# Patient Record
Sex: MALE | Race: WHITE | NOT HISPANIC OR LATINO
[De-identification: names, ages, dates, MRNs, and addresses within clinical notes are randomized per-mention and may not be internally consistent; named-entity substitution may affect disease eponyms.]

---

## 2017-03-03 ENCOUNTER — HOSPITAL (OUTPATIENT)
Dept: OTHER | Age: 77
End: 2017-03-03
Attending: EMERGENCY MEDICINE

## 2023-04-12 DIAGNOSIS — M62.81 MUSCLE WEAKNESS (GENERALIZED): Primary | ICD-10-CM

## 2023-05-03 ENCOUNTER — NURSE TRIAGE (OUTPATIENT)
Dept: TELEHEALTH | Age: 83
End: 2023-05-03

## 2024-07-26 ENCOUNTER — APPOINTMENT (OUTPATIENT)
Dept: URBAN - METROPOLITAN AREA CLINIC 240 | Age: 84
Setting detail: DERMATOLOGY
End: 2024-07-26

## 2024-07-26 DIAGNOSIS — Z41.9 ENCOUNTER FOR PROCEDURE FOR PURPOSES OTHER THAN REMEDYING HEALTH STATE, UNSPECIFIED: ICD-10-CM

## 2024-07-26 PROCEDURE — OTHER COSMETIC EXTRACTIONS: OTHER

## 2024-07-26 ASSESSMENT — LOCATION SIMPLE DESCRIPTION DERM
LOCATION SIMPLE: RIGHT CHEEK
LOCATION SIMPLE: LEFT CHEEK

## 2024-07-26 ASSESSMENT — LOCATION ZONE DERM: LOCATION ZONE: FACE

## 2024-07-26 ASSESSMENT — LOCATION DETAILED DESCRIPTION DERM
LOCATION DETAILED: LEFT LATERAL MALAR CHEEK
LOCATION DETAILED: RIGHT INFERIOR PREAURICULAR CHEEK

## 2024-07-26 NOTE — PROCEDURE: COSMETIC EXTRACTIONS
Detail Level: Detailed
Render The Number Of Extractions: Yes
Anesthesia Volume In Cc: 0
Consent: The patient's consent was obtained including but not limited to risks of crusting, scabbing, blistering, scarring, darker or lighter pigmentary change, recurrence, incomplete removal and infection.
Post-Care Instructions: I reviewed with the patient in detail post-care instructions. Patient is to wear sunprotection, and avoid picking at any of the treated lesions. Pt may apply Vaseline to crusted or scabbing areas.
Statement Selected

## 2024-12-10 ENCOUNTER — APPOINTMENT (OUTPATIENT)
Dept: URBAN - METROPOLITAN AREA CLINIC 240 | Age: 84
Setting detail: DERMATOLOGY
End: 2024-12-11

## 2024-12-10 DIAGNOSIS — L82.0 INFLAMED SEBORRHEIC KERATOSIS: ICD-10-CM

## 2024-12-10 DIAGNOSIS — L30.9 DERMATITIS, UNSPECIFIED: ICD-10-CM

## 2024-12-10 PROCEDURE — OTHER COUNSELING: OTHER

## 2024-12-10 PROCEDURE — OTHER INTRALESIONAL KENALOG: OTHER

## 2024-12-10 PROCEDURE — OTHER ADDITIONAL NOTES: OTHER

## 2024-12-10 PROCEDURE — 11900 INJECT SKIN LESIONS </W 7: CPT

## 2024-12-10 PROCEDURE — 99212 OFFICE O/P EST SF 10 MIN: CPT | Mod: 25

## 2024-12-10 PROCEDURE — OTHER PRESCRIPTION: OTHER

## 2024-12-10 RX ORDER — HYDROCORTISONE 25 MG/G
CREAM TOPICAL
Qty: 30 | Refills: 0 | Status: ERX | COMMUNITY
Start: 2024-12-10

## 2024-12-10 ASSESSMENT — LOCATION DETAILED DESCRIPTION DERM: LOCATION DETAILED: LEFT LATERAL MALAR CHEEK

## 2024-12-10 ASSESSMENT — LOCATION SIMPLE DESCRIPTION DERM: LOCATION SIMPLE: LEFT CHEEK

## 2024-12-10 ASSESSMENT — LOCATION ZONE DERM: LOCATION ZONE: FACE

## 2024-12-10 NOTE — PROCEDURE: INTRALESIONAL KENALOG
Validate Note Data When Using Inventory: Yes
X Size Of Lesion In Cm (Optional): 0
Concentration Of Kenalog Solution Injected (Mg/Ml): 10.0
Include Z78.9 (Other Specified Conditions Influencing Health Status) As An Associated Diagnosis?: No
Expiration Date For Kenlast (Optional): 10/2025
Show Inventory Tab: Hide
Kenalog Preparation: Kenalog
Medical Necessity Clause: This procedure was medically necessary because the lesions that were treated were:
Ndc# For Kenalog Only: 8971-2215-08
Total Volume (Ccs): 0.01
Treatment Number (Optional): 1
Detail Level: Detailed
Consent: The risks of atrophy were reviewed with the patient.
Size Of Lesion (Optional): 0.5
Administered By (Optional): Dr. Richardson
Kenalog Type Of Vial: Multiple Dose
Lot # For Kenalog (Optional): [id number]

## 2024-12-10 NOTE — PROCEDURE: ADDITIONAL NOTES
Additional Notes: No visible signs of inflammation at this time hx of welts or papules on scalp that were itchy successfully treated with HDC2.5 cream.
Detail Level: Simple
Render Risk Assessment In Note?: no

## 2025-03-29 ENCOUNTER — EXTERNAL LAB (OUTPATIENT)
Dept: HEALTH INFORMATION MANAGEMENT | Facility: OTHER | Age: 85
End: 2025-03-29

## 2025-03-29 LAB — LAB RESULT: NORMAL

## 2025-04-22 ENCOUNTER — OFFICE VISIT (OUTPATIENT)
Dept: INTERNAL MEDICINE | Age: 85
End: 2025-04-22

## 2025-04-22 VITALS
OXYGEN SATURATION: 98 % | HEART RATE: 52 BPM | SYSTOLIC BLOOD PRESSURE: 105 MMHG | DIASTOLIC BLOOD PRESSURE: 55 MMHG | TEMPERATURE: 98.1 F | RESPIRATION RATE: 18 BRPM

## 2025-04-22 DIAGNOSIS — R35.0 BENIGN PROSTATIC HYPERPLASIA WITH URINARY FREQUENCY: ICD-10-CM

## 2025-04-22 DIAGNOSIS — L29.9 ITCHING: ICD-10-CM

## 2025-04-22 DIAGNOSIS — M25.562 CHRONIC PAIN OF BOTH KNEES: ICD-10-CM

## 2025-04-22 DIAGNOSIS — G89.29 CHRONIC PAIN OF BOTH KNEES: ICD-10-CM

## 2025-04-22 DIAGNOSIS — N40.1 BENIGN PROSTATIC HYPERPLASIA WITH URINARY FREQUENCY: ICD-10-CM

## 2025-04-22 DIAGNOSIS — H90.3 SENSORINEURAL HEARING LOSS (SNHL) OF BOTH EARS: ICD-10-CM

## 2025-04-22 DIAGNOSIS — R42 DIZZINESS: Primary | ICD-10-CM

## 2025-04-22 DIAGNOSIS — M25.561 CHRONIC PAIN OF BOTH KNEES: ICD-10-CM

## 2025-04-22 DIAGNOSIS — I10 PRIMARY HYPERTENSION: ICD-10-CM

## 2025-04-22 DIAGNOSIS — R73.9 HYPERGLYCEMIA: ICD-10-CM

## 2025-04-22 DIAGNOSIS — N18.32 STAGE 3B CHRONIC KIDNEY DISEASE  (CMD): ICD-10-CM

## 2025-04-22 DIAGNOSIS — E78.5 DYSLIPIDEMIA: ICD-10-CM

## 2025-04-22 DIAGNOSIS — K21.9 GASTROESOPHAGEAL REFLUX DISEASE WITHOUT ESOPHAGITIS: ICD-10-CM

## 2025-04-22 PROBLEM — E55.9 VITAMIN D DEFICIENCY: Status: ACTIVE | Noted: 2025-04-22

## 2025-04-22 PROBLEM — K42.9 UMBILICAL HERNIA: Status: ACTIVE | Noted: 2025-04-22

## 2025-04-22 PROBLEM — H26.9 CATARACTS, BILATERAL: Status: ACTIVE | Noted: 2025-04-22

## 2025-04-22 PROBLEM — R97.20 ELEVATED PSA: Status: ACTIVE | Noted: 2025-04-22

## 2025-04-22 PROBLEM — I25.10 CORONARY ARTERY CALCIFICATION: Status: ACTIVE | Noted: 2025-04-22

## 2025-04-22 PROBLEM — F10.20 ALCOHOLISM  (CMD): Status: ACTIVE | Noted: 2025-04-22

## 2025-04-22 PROBLEM — N40.0 BPH (BENIGN PROSTATIC HYPERPLASIA): Status: ACTIVE | Noted: 2025-04-22

## 2025-04-22 PROBLEM — H40.9 GLAUCOMA: Status: ACTIVE | Noted: 2025-04-22

## 2025-04-22 PROBLEM — J30.9 AR (ALLERGIC RHINITIS): Status: ACTIVE | Noted: 2025-04-22

## 2025-04-22 PROBLEM — I62.9 INTRACRANIAL HEMORRHAGE  (CMD): Status: ACTIVE | Noted: 2025-04-22

## 2025-04-22 PROCEDURE — 80053 COMPREHEN METABOLIC PANEL: CPT | Performed by: CLINICAL MEDICAL LABORATORY

## 2025-04-22 PROCEDURE — 85025 COMPLETE CBC W/AUTO DIFF WBC: CPT | Performed by: CLINICAL MEDICAL LABORATORY

## 2025-04-22 PROCEDURE — 86140 C-REACTIVE PROTEIN: CPT | Performed by: CLINICAL MEDICAL LABORATORY

## 2025-04-22 PROCEDURE — 36415 COLL VENOUS BLD VENIPUNCTURE: CPT | Performed by: INTERNAL MEDICINE

## 2025-04-22 PROCEDURE — 85652 RBC SED RATE AUTOMATED: CPT | Performed by: CLINICAL MEDICAL LABORATORY

## 2025-04-22 PROCEDURE — 82785 ASSAY OF IGE: CPT | Performed by: CLINICAL MEDICAL LABORATORY

## 2025-04-22 PROCEDURE — 83036 HEMOGLOBIN GLYCOSYLATED A1C: CPT | Performed by: CLINICAL MEDICAL LABORATORY

## 2025-04-22 RX ORDER — HYDROCORTISONE 25 MG/G
CREAM TOPICAL 3 TIMES DAILY
COMMUNITY
Start: 2025-02-06

## 2025-04-22 RX ORDER — FINASTERIDE 5 MG/1
1 TABLET, FILM COATED ORAL DAILY
COMMUNITY
Start: 2025-03-14

## 2025-04-22 RX ORDER — AMLODIPINE BESYLATE 10 MG/1
1 TABLET ORAL DAILY
COMMUNITY
Start: 2025-01-29

## 2025-04-22 ASSESSMENT — PATIENT HEALTH QUESTIONNAIRE - PHQ9
CLINICAL INTERPRETATION OF PHQ2 SCORE: NO FURTHER SCREENING NEEDED
SUM OF ALL RESPONSES TO PHQ9 QUESTIONS 1 AND 2: 0
SUM OF ALL RESPONSES TO PHQ9 QUESTIONS 1 AND 2: 0
1. LITTLE INTEREST OR PLEASURE IN DOING THINGS: NOT AT ALL
2. FEELING DOWN, DEPRESSED OR HOPELESS: NOT AT ALL

## 2025-04-23 ENCOUNTER — LAB SERVICES (OUTPATIENT)
Dept: INTERNAL MEDICINE | Age: 85
End: 2025-04-23

## 2025-04-23 DIAGNOSIS — R42 DIZZINESS: ICD-10-CM

## 2025-04-23 LAB
ALBUMIN SERPL-MCNC: 3.9 G/DL (ref 3.4–5)
ALBUMIN/GLOB SERPL: 1.1 {RATIO} (ref 1–2.4)
ALP SERPL-CCNC: 42 UNITS/L (ref 45–117)
ALT SERPL-CCNC: 21 UNITS/L
ANION GAP SERPL CALC-SCNC: 12 MMOL/L (ref 7–19)
APPEARANCE UR: CLEAR
AST SERPL-CCNC: 19 UNITS/L
BACTERIA #/AREA URNS HPF: ABNORMAL /HPF
BASOPHILS # BLD: 0 K/MCL (ref 0–0.3)
BASOPHILS NFR BLD: 1 %
BILIRUB SERPL-MCNC: 0.6 MG/DL (ref 0.2–1)
BILIRUB UR QL STRIP: NEGATIVE
BUN SERPL-MCNC: 25 MG/DL (ref 6–20)
BUN/CREAT SERPL: 15 (ref 7–25)
CALCIUM SERPL-MCNC: 9.5 MG/DL (ref 8.4–10.2)
CHLORIDE SERPL-SCNC: 110 MMOL/L (ref 97–110)
CO2 SERPL-SCNC: 23 MMOL/L (ref 21–32)
COLOR UR: ABNORMAL
CREAT SERPL-MCNC: 1.7 MG/DL (ref 0.67–1.17)
CRP SERPL-MCNC: <5 MG/L
DEPRECATED RDW RBC: 40.9 FL (ref 39–50)
EGFRCR SERPLBLD CKD-EPI 2021: 40 ML/MIN/{1.73_M2}
EOSINOPHIL # BLD: 0.2 K/MCL (ref 0–0.5)
EOSINOPHIL NFR BLD: 4 %
ERYTHROCYTE [DISTWIDTH] IN BLOOD: 13 % (ref 11–15)
ERYTHROCYTE [SEDIMENTATION RATE] IN BLOOD BY WESTERGREN METHOD: 18 MM/HR (ref 0–20)
FASTING DURATION TIME PATIENT: ABNORMAL H
GLOBULIN SER-MCNC: 3.4 G/DL (ref 2–4)
GLUCOSE SERPL-MCNC: 114 MG/DL (ref 70–99)
GLUCOSE UR STRIP-MCNC: NEGATIVE MG/DL
HBA1C MFR BLD: 5.6 % (ref 4.5–5.6)
HCT VFR BLD CALC: 41.5 % (ref 39–51)
HGB BLD-MCNC: 14.1 G/DL (ref 13–17)
HGB UR QL STRIP: NEGATIVE
HYALINE CASTS #/AREA URNS LPF: ABNORMAL /LPF
IGE SERPL-ACNC: 46.7 IUNITS/ML
IMM GRANULOCYTES # BLD AUTO: 0 K/MCL (ref 0–0.2)
IMM GRANULOCYTES # BLD: 0 %
KETONES UR STRIP-MCNC: NEGATIVE MG/DL
LEUKOCYTE ESTERASE UR QL STRIP: ABNORMAL
LYMPHOCYTES # BLD: 2.1 K/MCL (ref 1–4)
LYMPHOCYTES NFR BLD: 33 %
MCH RBC QN AUTO: 29.6 PG (ref 26–34)
MCHC RBC AUTO-ENTMCNC: 34 G/DL (ref 32–36.5)
MCV RBC AUTO: 87 FL (ref 78–100)
MONOCYTES # BLD: 0.7 K/MCL (ref 0.3–0.9)
MONOCYTES NFR BLD: 11 %
NEUTROPHILS # BLD: 3.3 K/MCL (ref 1.8–7.7)
NEUTROPHILS NFR BLD: 51 %
NITRITE UR QL STRIP: NEGATIVE
NRBC BLD MANUAL-RTO: 0 /100 WBC
PH UR STRIP: 7 [PH] (ref 5–7)
PLATELET # BLD AUTO: 250 K/MCL (ref 140–450)
POTASSIUM SERPL-SCNC: 4.5 MMOL/L (ref 3.4–5.1)
PROT SERPL-MCNC: 7.3 G/DL (ref 6.4–8.2)
PROT UR STRIP-MCNC: NEGATIVE MG/DL
RBC # BLD: 4.77 MIL/MCL (ref 4.5–5.9)
RBC #/AREA URNS HPF: ABNORMAL /HPF
SODIUM SERPL-SCNC: 140 MMOL/L (ref 135–145)
SP GR UR STRIP: 1.01 (ref 1–1.03)
SQUAMOUS #/AREA URNS HPF: ABNORMAL /HPF
UROBILINOGEN UR STRIP-MCNC: 0.2 MG/DL
WBC # BLD: 6.4 K/MCL (ref 4.2–11)
WBC #/AREA URNS HPF: ABNORMAL /HPF

## 2025-04-23 PROCEDURE — 87086 URINE CULTURE/COLONY COUNT: CPT | Performed by: CLINICAL MEDICAL LABORATORY

## 2025-04-23 PROCEDURE — 81001 URINALYSIS AUTO W/SCOPE: CPT | Performed by: CLINICAL MEDICAL LABORATORY

## 2025-04-24 LAB — BACTERIA UR CULT: NO GROWTH

## 2025-04-28 ENCOUNTER — RESULTS FOLLOW-UP (OUTPATIENT)
Dept: INTERNAL MEDICINE | Age: 85
End: 2025-04-28

## 2025-05-02 PROBLEM — E78.5 DYSLIPIDEMIA: Status: ACTIVE | Noted: 2020-05-05

## 2025-05-02 PROBLEM — M25.561 CHRONIC PAIN OF BOTH KNEES: Status: ACTIVE | Noted: 2022-10-21

## 2025-05-02 PROBLEM — G89.29 CHRONIC PAIN OF BOTH KNEES: Status: ACTIVE | Noted: 2022-10-21

## 2025-05-02 PROBLEM — L29.9 ITCHING: Status: ACTIVE | Noted: 2025-05-02

## 2025-05-02 PROBLEM — R42 DIZZINESS: Status: ACTIVE | Noted: 2025-05-02

## 2025-05-02 PROBLEM — N18.32 STAGE 3B CHRONIC KIDNEY DISEASE  (CMD): Status: ACTIVE | Noted: 2025-05-02

## 2025-05-02 PROBLEM — H90.3 SENSORINEURAL HEARING LOSS (SNHL) OF BOTH EARS: Status: ACTIVE | Noted: 2022-10-21

## 2025-05-02 PROBLEM — R73.9 HYPERGLYCEMIA: Status: ACTIVE | Noted: 2025-05-02

## 2025-05-02 PROBLEM — F10.20 ALCOHOLISM  (CMD): Status: RESOLVED | Noted: 2025-04-22 | Resolved: 2025-05-02

## 2025-05-02 PROBLEM — M25.562 CHRONIC PAIN OF BOTH KNEES: Status: ACTIVE | Noted: 2022-10-21

## 2025-05-02 ASSESSMENT — ENCOUNTER SYMPTOMS
SHORTNESS OF BREATH: 0
LIGHT-HEADEDNESS: 1
ABDOMINAL PAIN: 0
DIZZINESS: 1
WHEEZING: 0
CONSTITUTIONAL NEGATIVE: 1